# Patient Record
Sex: MALE | Race: WHITE | Employment: FULL TIME | URBAN - METROPOLITAN AREA
[De-identification: names, ages, dates, MRNs, and addresses within clinical notes are randomized per-mention and may not be internally consistent; named-entity substitution may affect disease eponyms.]

---

## 2019-01-13 ENCOUNTER — APPOINTMENT (OUTPATIENT)
Dept: GENERAL RADIOLOGY | Age: 54
End: 2019-01-13
Payer: COMMERCIAL

## 2019-01-13 ENCOUNTER — HOSPITAL ENCOUNTER (EMERGENCY)
Age: 54
Discharge: HOME | End: 2019-01-13
Payer: COMMERCIAL

## 2019-01-13 VITALS
RESPIRATION RATE: 16 BRPM | BODY MASS INDEX: 29.18 KG/M2 | SYSTOLIC BLOOD PRESSURE: 148 MMHG | HEIGHT: 69 IN | WEIGHT: 197 LBS | HEART RATE: 75 BPM | OXYGEN SATURATION: 96 % | TEMPERATURE: 98.2 F | DIASTOLIC BLOOD PRESSURE: 92 MMHG

## 2019-01-13 DIAGNOSIS — S61.451A DOG BITE OF RIGHT HAND, INITIAL ENCOUNTER: Primary | ICD-10-CM
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
## 2019-01-13 PROCEDURE — 73130 X-RAY EXAM OF HAND: CPT

## 2019-01-13 PROCEDURE — 90471 IMMUNIZATION ADMIN: CPT | Performed by: PHYSICIAN ASSISTANT

## 2019-01-13 PROCEDURE — 90715 TDAP VACCINE 7 YRS/> IM: CPT | Performed by: PHYSICIAN ASSISTANT

## 2019-01-13 PROCEDURE — 96365 THER/PROPH/DIAG IV INF INIT: CPT

## 2019-01-13 PROCEDURE — 96368 THER/DIAG CONCURRENT INF: CPT

## 2019-01-13 PROCEDURE — 6360000002 HC RX W HCPCS: Performed by: PHYSICIAN ASSISTANT

## 2019-01-13 PROCEDURE — 2500000003 HC RX 250 WO HCPCS: Performed by: PHYSICIAN ASSISTANT

## 2019-01-13 PROCEDURE — 96366 THER/PROPH/DIAG IV INF ADDON: CPT

## 2019-01-13 PROCEDURE — 99283 EMERGENCY DEPT VISIT LOW MDM: CPT

## 2019-01-13 RX ORDER — LEVOFLOXACIN 5 MG/ML
500 INJECTION, SOLUTION INTRAVENOUS EVERY 24 HOURS
Status: DISCONTINUED | OUTPATIENT
Start: 2019-01-13 | End: 2019-01-13 | Stop reason: HOSPADM

## 2019-01-13 RX ORDER — CLINDAMYCIN PHOSPHATE 600 MG/50ML
600 INJECTION INTRAVENOUS ONCE
Status: COMPLETED | OUTPATIENT
Start: 2019-01-13 | End: 2019-01-13

## 2019-01-13 RX ORDER — DIAPER,BRIEF,INFANT-TODD,DISP
EACH MISCELLANEOUS ONCE
Status: DISCONTINUED | OUTPATIENT
Start: 2019-01-13 | End: 2019-01-13 | Stop reason: HOSPADM

## 2019-01-13 RX ORDER — IBUPROFEN 800 MG/1
800 TABLET ORAL EVERY 6 HOURS PRN
Qty: 20 TABLET | Refills: 0 | Status: SHIPPED | OUTPATIENT
Start: 2019-01-13 | End: 2022-08-22

## 2019-01-13 RX ORDER — DIAPER,BRIEF,INFANT-TODD,DISP
EACH MISCELLANEOUS
Status: DISCONTINUED
Start: 2019-01-13 | End: 2019-01-13 | Stop reason: HOSPADM

## 2019-01-13 RX ORDER — AMOXICILLIN AND CLAVULANATE POTASSIUM 875; 125 MG/1; MG/1
1 TABLET, FILM COATED ORAL 2 TIMES DAILY
Qty: 14 TABLET | Refills: 0 | Status: SHIPPED | OUTPATIENT
Start: 2019-01-13 | End: 2019-01-20

## 2019-01-13 RX ADMIN — CLINDAMYCIN PHOSPHATE 600 MG: 600 INJECTION, SOLUTION INTRAVENOUS at 15:33

## 2019-01-13 RX ADMIN — LEVOFLOXACIN 500 MG: 5 INJECTION, SOLUTION INTRAVENOUS at 15:33

## 2019-01-13 RX ADMIN — TETANUS TOXOID, REDUCED DIPHTHERIA TOXOID AND ACELLULAR PERTUSSIS VACCINE, ADSORBED 0.5 ML: 5; 2.5; 8; 8; 2.5 SUSPENSION INTRAMUSCULAR at 15:33

## 2019-01-13 ASSESSMENT — PAIN DESCRIPTION - LOCATION: LOCATION: HAND

## 2019-01-13 ASSESSMENT — PAIN DESCRIPTION - ORIENTATION: ORIENTATION: RIGHT

## 2019-01-13 ASSESSMENT — PAIN DESCRIPTION - PAIN TYPE: TYPE: ACUTE PAIN

## 2019-01-13 ASSESSMENT — PAIN SCALES - GENERAL: PAINLEVEL_OUTOF10: 4

## 2019-01-13 ASSESSMENT — PAIN DESCRIPTION - DESCRIPTORS: DESCRIPTORS: ACHING

## 2022-08-22 ENCOUNTER — APPOINTMENT (OUTPATIENT)
Dept: GENERAL RADIOLOGY | Age: 57
End: 2022-08-22
Payer: COMMERCIAL

## 2022-08-22 ENCOUNTER — HOSPITAL ENCOUNTER (EMERGENCY)
Age: 57
Discharge: HOME | End: 2022-08-22
Attending: EMERGENCY MEDICINE
Payer: COMMERCIAL

## 2022-08-22 VITALS
WEIGHT: 209 LBS | OXYGEN SATURATION: 98 % | HEART RATE: 67 BPM | RESPIRATION RATE: 17 BRPM | SYSTOLIC BLOOD PRESSURE: 173 MMHG | DIASTOLIC BLOOD PRESSURE: 119 MMHG | TEMPERATURE: 97.4 F | BODY MASS INDEX: 30.96 KG/M2 | HEIGHT: 69 IN

## 2022-08-22 DIAGNOSIS — S46.902A OPEN WOUND OF LEFT UPPER EXTREMITY WITH TENDON INJURY, INITIAL ENCOUNTER: Primary | ICD-10-CM

## 2022-08-22 DIAGNOSIS — S61.512A LACERATION OF LEFT WRIST WITH TENDON INVOLVEMENT, INITIAL ENCOUNTER: ICD-10-CM

## 2022-08-22 DIAGNOSIS — S41.102A OPEN WOUND OF LEFT UPPER EXTREMITY WITH TENDON INJURY, INITIAL ENCOUNTER: Primary | ICD-10-CM

## 2022-08-22 DIAGNOSIS — S66.922A LACERATION OF LEFT WRIST WITH TENDON INVOLVEMENT, INITIAL ENCOUNTER: ICD-10-CM

## 2022-08-22 PROCEDURE — 2500000003 HC RX 250 WO HCPCS: Performed by: NURSE PRACTITIONER

## 2022-08-22 PROCEDURE — 73110 X-RAY EXAM OF WRIST: CPT

## 2022-08-22 PROCEDURE — 12004 RPR S/N/AX/GEN/TRK7.6-12.5CM: CPT

## 2022-08-22 PROCEDURE — 99283 EMERGENCY DEPT VISIT LOW MDM: CPT

## 2022-08-22 PROCEDURE — 6370000000 HC RX 637 (ALT 250 FOR IP): Performed by: NURSE PRACTITIONER

## 2022-08-22 RX ORDER — CEPHALEXIN 500 MG/1
500 CAPSULE ORAL 4 TIMES DAILY
Qty: 20 CAPSULE | Refills: 0 | Status: SHIPPED | OUTPATIENT
Start: 2022-08-22 | End: 2022-08-27

## 2022-08-22 RX ORDER — IBUPROFEN 600 MG/1
600 TABLET ORAL EVERY 8 HOURS PRN
Qty: 30 TABLET | Refills: 0 | Status: SHIPPED | OUTPATIENT
Start: 2022-08-22 | End: 2022-09-02

## 2022-08-22 RX ORDER — IBUPROFEN 800 MG/1
800 TABLET ORAL ONCE
Status: COMPLETED | OUTPATIENT
Start: 2022-08-22 | End: 2022-08-22

## 2022-08-22 RX ORDER — LIDOCAINE HYDROCHLORIDE AND EPINEPHRINE 10; 10 MG/ML; UG/ML
20 INJECTION, SOLUTION INFILTRATION; PERINEURAL ONCE
Status: COMPLETED | OUTPATIENT
Start: 2022-08-22 | End: 2022-08-22

## 2022-08-22 RX ADMIN — IBUPROFEN 800 MG: 800 TABLET, FILM COATED ORAL at 03:57

## 2022-08-22 RX ADMIN — LIDOCAINE HYDROCHLORIDE AND EPINEPHRINE 20 ML: 10; 10 INJECTION, SOLUTION INFILTRATION; PERINEURAL at 03:29

## 2022-08-22 NOTE — ED PROVIDER NOTES
ATTENDING PROVIDER ATTESTATION:     Park Oliveira presented to the emergency department for evaluation of Laceration (Pt to ED stating a piece of sheet metal sliced his L-wrist around midnight while at work. Pt states he is up to date w tetanus shot, bleeding controlled. Pt able to move all fingers except thumb. Denies numbness or tingling.)    I have reviewed and discussed the case, including pertinent history (medical, surgical, family and social) and exam findings with the Midlevel and the Nurse assigned to Park Oliveira. I have personally performed and/or participated in the history, exam, medical decision making, and procedures and agree with all pertinent clinical information. I have personally performed a substantive portion of the encounter      I have reviewed my findings and recommendations with Park Oliveira and members of family present at the time of disposition. My findings/plan: The primary encounter diagnosis was Open wound of left upper extremity with tendon injury, initial encounter. A diagnosis of Laceration of left wrist with tendon involvement, initial encounter was also pertinent to this visit. I, Dr. Uvaldo Vincent, am the primary provider of this record. New Prescriptions    CEPHALEXIN (KEFLEX) 500 MG CAPSULE    Take 1 capsule by mouth 4 times daily for 5 days    IBUPROFEN (IBU) 600 MG TABLET    Take 1 tablet by mouth every 8 hours as needed for Pain Take with food. Rosina Oliver DO      ED Attending shared visit  CC: No       [address] [address] [facility]  Department of Emergency Medicine   ED  Encounter Note  Admit Date/RoomTime: 2022  3:13 AM  ED Room:     NAME: Park Oliveira  : 1965  MRN: [id number]     Chief Complaint:  Laceration (Pt to ED stating a piece of sheet metal sliced his L-wrist around midnight while at work. Pt states he is up to date w tetanus shot, bleeding controlled. Pt able to move all fingers except thumb.  Denies numbness or tingling.)    History of Present Illness       Nely Patton is a 55 y.o. old male presenting to the emergency department by private vehicle, for a laceration to the left wrist, caused by sheet metal, which occurred at work approximately several hour(s) prior to arrival.  There is not a possibility of retained foreign body in the affected area. Bleeding is  controlled. He takes no blood thinning agents. There is pain at injury site. Tetanus Status:  up to date. ROS   Pertinent positives and negatives are stated within HPI, all other systems reviewed and are negative. Past Medical History:  has a past medical history of Hypertension. Surgical History:  has no past surgical history on file. Social History:  reports that he has never smoked. His smokeless tobacco use includes chew. He reports that he does not drink alcohol and does not use drugs. Family History: family history is not on file. Allergies: Patient has no known allergies. Physical Exam  Physical Exam   Oxygen Saturation Interpretation: Normal.        ED Triage Vitals   BP Temp Temp src Heart Rate Resp SpO2 Height Weight   08/22/22 0311 08/22/22 0307 -- 08/22/22 0307 08/22/22 0311 08/22/22 0307 08/22/22 0311 08/22/22 0311   (!) 173/119 97.4 °F (36.3 °C)  67 17 98 % 5' 9\" (1.753 m) 209 lb (94.8 kg)         Constitutional:  Alert, development consistent with age. Neck:  Normal ROM. Supple. Non-tender. Hand: Left             Tenderness: none. Swelling: none. Deformity: No.             Skin: no wounds, erythema, or swelling. Neurovascular: Motor deficit: none. Sensory deficit: none. Pulse deficit: none. Capillary refill: normal.  Fingers:  Left Thumb             Tenderness:  mild. Swelling: none. Deformity: No.              ROM: diminished range with pain.             Skin:  no wounds, erythema, or swelling. Wrist:  Left distal radius             Tenderness:  mild. Swelling: None. Deformity: No.             ROM: full range with pain, Full range of motion. Skin:  8cm linear laceration. Lymphatics: No lymphangitis or adenopathy noted. Neurological:  Oriented. Motor functions intact. Lab / Imaging Results   (All laboratory and radiology results have been personally reviewed by myself)  Labs:  No results found for this visit on 08/22/22. Imaging: All Radiology results interpreted by Radiologist unless otherwise noted. XR WRIST LEFT (MIN 3 VIEWS)   Final Result   No acute osseous abnormality. No radiopaque foreign body. ED Course / Medical Decision Making     Medications   lidocaine-EPINEPHrine 1 %-1:[id number] injection 20 mL (20 mLs IntraDERmal Given by Other 8/22/22 7343)   ibuprofen (ADVIL;MOTRIN) tablet 800 mg (800 mg Oral Given 8/22/22 3266)       Consult(s):   IP CONSULT TO ORTHOPEDIC SURGERY        MDM: Patient is well-appearing, GCS of 15. Presents with laceration to left radial wrist after being struck with sheet metal while at work. On exam patient has limited range of motion of the left thumb concerns for tendon injury. X-ray obtained negative for any acute findings. Orthopedics was consulted see separate note, disposition per orthopedics. Orthopedic surgery close the patient's wound and will follow up with the patient and 10 to 14 days outpatient. Patient will be started on Keflex for infection prophylaxis ibuprofen as needed for pain and patient advised to follow-up outpatient with hand surgery instructions given as well as return precautions. Plan of Care/Counseling:  DARLYN Perscott CNP reviewed today's visit with the patient in addition to providing specific details for the plan of care and counseling regarding the diagnosis and prognosis. Questions are answered at this time and are agreeable with the plan. Assessment      1. Open wound of left upper extremity with tendon injury, initial encounter    2. Laceration of left wrist with tendon involvement, initial encounter      Plan   Discharged home. Patient condition is good    New Medications     New Prescriptions    CEPHALEXIN (KEFLEX) 500 MG CAPSULE    Take 1 capsule by mouth 4 times daily for 5 days    IBUPROFEN (IBU) 600 MG TABLET    Take 1 tablet by mouth every 8 hours as needed for Pain Take with food. Electronically signed by DARLYN Tam CNP   DD: 8/22/22  **This report was transcribed using voice recognition software. Every effort was made to ensure accuracy; however, inadvertent computerized transcription errors may be present.   END OF ED PROVIDER NOTE       Stella Naqvi DO  08/22/22 2979

## 2022-08-22 NOTE — CONSULTS
Department of Orthopedic Surgery  Resident Consult Note    Reason for Consult:  laceration    HISTORY OF PRESENT ILLNESS:       Patient is a 55 y.o. male with laceration to the left radial aspect of the wrist. Reports that a piece of metal fell onto his wrist while he was at work. He initially presented to an ED in [facility], but there was a 4 hour wait so he presented to this facility. Reports that his L thumb ROM has improved over the past hour or so. Denies numbness/tingling/paresthesias. Denies any other orthopedic complaints at this time. Past Medical History:        Diagnosis Date    Hypertension      Past Surgical History:    History reviewed. No pertinent surgical history. Current Medications:   No current facility-administered medications for this encounter. Allergies:  Patient has no known allergies. Social History:   TOBACCO:   reports that he has never smoked. His smokeless tobacco use includes chew. ETOH:   reports no history of alcohol use. DRUGS:   reports no history of drug use. ACTIVITIES OF DAILY LIVING:    OCCUPATION:    Family History:   History reviewed. No pertinent family history.     REVIEW OF SYSTEMS:  CONSTITUTIONAL:  negative for fevers, chills  EYES:  negative for acute changes  HEENT:  negative for acute changes  RESPIRATORY:  negative for dyspnea  CARDIOVASCULAR:  negative for chest pain, palpitations  GASTROINTESTINAL:  negative for nausea, vomiting  GENITOURINARY:  negative for frequency  HEMATOLOGIC/LYMPHATIC:  negative for bleeding and petechiae  MUSCULOSKELETAL:  positive for left forearm laceration  NEUROLOGICAL:  negative for head trauma or LOC  BEHAVIOR/PSYCH:  negative for increased agitation and anxiety    PHYSICAL EXAM:    VITALS:  BP (!) 173/119   Pulse 67   Temp 97.4 °F (36.3 °C)   Resp 17   Ht 5' 9\" (1.753 m)   Wt 209 lb (94.8 kg)   SpO2 98%   BMI 30.86 kg/m²   CONSTITUTIONAL:  awake, alert, cooperative, no apparent distress, and appears stated age  EYES: Lids and lashes normal, extra ocular muscles intact, sclera clear, conjunctiva normal  ENT: Normocephalic, without obvious abnormality, atraumatic, external ears without lesions, oral pharynx with moist mucus membranes  NECK: Trachea midline, skin normal  LUNGS: No increased work of breathing, good air exchange  CARDIOVASCULAR: 2+ pulses throughout and capillary Refill less than 2 seconds  ABDOMEN: soft, non-distended, non-tender and no rebound tenderness or guarding  NEUROLOGIC: Awake, alert, oriented to name, place and time. Motor and sensory abnormalities noted below, otherwise motor is 5 out of 5 bilaterally and sensory is intact. MUSCULOSKELETAL:  Left upper Extremity:  8 cm laceration over the radial aspect of the wrist. No active bleeding or venous ooze appreciated. Deep fascia is not violated. Otherwise, skin intact circumferentially   Compartments soft and compressible  +AIN/PIN/Ulnar nerve function intact grossly  +Radial pulse, Brisk Cap refill, hand warm and perfused  Sensation intact to touch in radial/ulnar/median nerve distributions to hand  Patient able to flex and extend all digits, most notably he is able to extend his first IP and MCP joint    Secondary Exam:   Right UE: No obvious signs of trauma. -TTP to fingers, hand, wrist, forearm, elbow, humerus, shoulder or clavicle. -- Patient able to flex/extend fingers, wrist, elbow and shoulder with active and passive ROM without pain, compartments soft and compressible. DATA:    CBC: No results found for: WBC, RBC, HGB, HCT, MCV, MCH, MCHC, RDW, PLT, MPV  PT/INR:  No results found for: PROTIME, INR    Radiology Review:  XR left wrist: no acute osseous abnormalities appreciated.      IMPRESSION:  Superficial laceration to the radial wrist    PLAN:  WBAT L UE  Laceration Repair Procedure Note: The patient was placed in the appropriate position and anesthesia around the laceration was obtained by infiltration using 1% Lidocaine without epinephrine. The area was then cleansed with betadine and draped in a sterile fashion. The laceration was closed with 3-0 Ethilon using interrupted sutures. There were no additional lacerations requiring repair. The wound area was then dressed with a sterile dressing. Total wound length 8 cm.    Multimodal pain control  Keep dressing clean and dry  Rest, ice, elevate L UE  Follow up outpatient with Dr. Alesha Barillas in 10-14 days  Plan was discussed with attending    All questions were sought and answered during encounter    Electronically signed by Christiano Matias DO on 8/22/2022 at 5:10 AM

## 2022-08-23 ENCOUNTER — TELEPHONE (OUTPATIENT)
Dept: ORTHOPEDIC SURGERY | Age: 57
End: 2022-08-23

## 2022-08-23 NOTE — TELEPHONE ENCOUNTER
Patient was seen at [facility] for Open wound of left upper extremity with tendon injury, initial encounter; Laceration of left wrist with tendon involvement, initial encounter. Was advised to follow up with Dr Camden Sandoval. Please contact the patient to schedule.

## 2022-08-23 NOTE — TELEPHONE ENCOUNTER
Call placed to patient's spouse, appointment made at this time. Future Appointments   Date Time Provider Suzette Eduardo   9/2/2022  9:00 AM SCHEDULE, SE ORTHO [facility]     Directions to office provided and patient verbalized understanding and is agreeable with plan.

## 2022-08-26 ENCOUNTER — TELEPHONE (OUTPATIENT)
Dept: ORTHOPEDIC SURGERY | Age: 57
End: 2022-08-26

## 2022-08-26 NOTE — TELEPHONE ENCOUNTER
Pt has a scheduled appt   Future Appointments   Date Time Provider Suzette Alesha   9/2/2022  9:00 AM SCHEDULE, SE ORTHO RES SE [facility]     He needs a RTW letter stating that he can work light duty. 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 He has already been working but he employer is requesting a letter. He will call back w/ a fax#. Routing to provider for review & guidance.

## 2022-08-26 NOTE — TELEPHONE ENCOUNTER
Letter for RTW printed. According to ortho consult, no tendon involvement, just superficial laceration and can weight bear through the affected extremity.

## 2022-08-26 NOTE — LETTER
[address]  [address]  [address] [facility] [address]  Phone: [phone]  Fax: [phone]    Candace Fontenot      August 26, 2022     Patient: Nurys Monge   YOB: 1965   Date of Visit: 8/26/2022       To Whom It May Concern: It is my medical opinion that Matt Garcia be released to work light duty. He must keep any healing wounds covered at all times. He can weight bear up to 25 lbs through the affected extremity. If you have any questions or concerns, please don't hesitate to call.     Sincerely,        Chris Sheriff PA-C

## 2022-09-02 ENCOUNTER — OFFICE VISIT (OUTPATIENT)
Dept: ORTHOPEDIC SURGERY | Age: 57
End: 2022-09-02
Payer: COMMERCIAL

## 2022-09-02 VITALS — HEIGHT: 69 IN | BODY MASS INDEX: 30.36 KG/M2 | WEIGHT: 205 LBS

## 2022-09-02 DIAGNOSIS — S61.512A LACERATION OF SKIN OF LEFT WRIST, INITIAL ENCOUNTER: Primary | ICD-10-CM

## 2022-09-02 PROCEDURE — 99213 OFFICE O/P EST LOW 20 MIN: CPT | Performed by: STUDENT IN AN ORGANIZED HEALTH CARE EDUCATION/TRAINING PROGRAM

## 2022-09-02 PROCEDURE — 99212 OFFICE O/P EST SF 10 MIN: CPT | Performed by: ORTHOPAEDIC SURGERY

## 2022-09-02 NOTE — PROGRESS NOTES
Chief Complaint   Patient presents with    Follow-up     Follolw up L wrist lac. Patient has kept the area clean and dry since injury. Patient reports one of the sutures came out. Denies pain at rest, reports numbness and tingling with supination. SUBJECTIVE: Patient is a 61-year-old male presenting for follow-up regarding a laceration injury to the left wrist he sustained at work on 8/22/2022. He was evaluated by an orthopedic resident in the emergency department and his wound was irrigated, debrided and closed with nylon suture. He has continued to work with light duty restrictions since that time. He has been keeping the wound covered when working. He does have pain with supination activities primarily. He notes resolving feelings of numbness and tingling in the hand since the injury. Pain is well controlled at this point. No additional injuries or complaints. Review of Systems:  General ROS: negative for - chills, fatigue, fever or night sweats  Respiratory ROS: no cough, shortness of breath, or wheezing  Cardiovascular ROS: no chest pain or dyspnea on exertion  Gastrointestinal ROS: no abdominal pain, change in bowel habits, or black or bloody stools  Genitourinary: no hematuria, dysuria, or incontinence   Musculoskeletal ROS:see above  Neurological ROS: no TIA or stroke symptoms       OBJECTIVE:   Alert and oriented X 3, no acute distress, respirations easy and unlabored with no audible wheezes, skin warm and dry, speech and dress appropriate for noted age, affect euthymic. Extremity:  Laceration over the dorsal and radial aspect of the wrist is well approximated with nylon sutures. No drainage present today. There is localized erythema about the sutures.   Sensation present and normal per patient in the radial, ulnar, and median nerve distributions  Motor function intact to AIN, PIN, median, radial, and ulnar nerve distributions  Radial pulse +2/4, capillary refill intact all digits  Compartments soft and compressible      Ht 5' 9\" (1.753 m)   Wt 205 lb (93 kg)   BMI 30.27 kg/m²     ASSESSMENT:     Diagnosis Orders   1. Laceration of skin of left wrist, initial encounter            PLAN:    Laceration site over the dorsal radial aspect of the left wrist is healing. We discussed with patient the importance of keeping a close eye in the wound for any signs of developing infection which would include increased swelling, erythema, drainage, and/or pain. He will seek urgent medical evaluation and treatment should these present. We will otherwise plan on seeing him back in clinic in 2 weeks for repeat examination. Provided his laceration site continues to heal uneventfully, we anticipate transition back to full duty at that time. [facility] Patient Plan Of Care  New Orders Needing C-9 Authorization: None at this appointment  [employer]/Work Status: Released to light duty if available with restrictions of no lifting heavier than 25 pounds with the left upper extremity  Patient Progressing: Progressing as expected  Patient candidate for Vocational Rehab at this time: No  Patient determined to be [address] [place] [address] at this visit: No        Electronically signed by Arnoldo Gaffney DO on 9/2/2022 at 9:21 AM  Note: This report was completed using computerVitaldent voiced recognition software. Every effort has been made to ensure accuracy; however, inadvertent computerized transcription errors may be present. I performed a history and physical exam of the patient, reviewed pertinent imaging, and discussed the patient's management with the resident. I reviewed the resident's note and agree with the documented findings and plan of care.

## 2022-09-02 NOTE — LETTER
[address]  [address]  [address] [facility] [address]  Phone: [phone]  Fax: [phone]        September 2, 2022     Patient: Maco Goyal   YOB: 1965   Date of Visit: 9/2/2022       To Whom It May Concern: It is my medical opinion that Kvng Kam be released to work light duty. He must keep any healing wounds covered at all times. He can weight bear up to 25 lbs through the affected extremity. These restrictions must remain in place until re-evaluated in office on 9/15/2022. Future Appointments   Date Time Provider Suzette Eduardo   9/15/2022  7:45 AM Remi Harvey DO [facility]          If you have any questions or concerns, please don't hesitate to call.     Sincerely,          Abigail Mendoza, DO

## 2022-09-02 NOTE — PATIENT INSTRUCTIONS
Department of Orthopaedic Trauma  [facility]    Dr. Viki Hocking, DO Dr. Genaro Solid, MD Dr. Twanda Piccolo, MD Eddie Hones, PA-C Samella Fluke PA-C Martyn Snellen PA-C    Orthopaedics Discharge Instructions   Weight bearing Status - Weight bearing as tolerated - on left upper Extremity. Continue light duty restrictions, no lifting heavier than 25 pounds with the left upper extremity. Ice to operative/injured site for 15-30 minutes of each hour for next 5 days    Recommend that you continue to ice the area 2-3 times per day after this   Elevate operative/injured limb on 2 pillows at home  Goal is to have limb above the heart if able    Wound care -keep incision covered when working. Take care to keep wound clean and dry. Incision should not be covered at Rest.    Follow Up in Office in 2 weeks for repeat examination and evaluation of wound. Call the office at [phone] for directions or with any questions. Watch for these significant complications. Call physician if they or any other problems occur:  Fever over 101°, redness, swelling or warmth at the operative site  Unrelieved nausea    Foul smelling or cloudy drainage at the operative site   Unrelieved pain    Blood soaked dressing.  (Some oozing may be normal)     Numb, pale, blue, cold or tingling extremity    Future Appointments   Date Time Provider Suzette Eduardo   9/15/2022  7:45 AM Caroline Staley DO [facility]         Directions to Orthopaedic Trauma Office at [facility]:   [address] [place] [address], [facility] [address]  through the ED parking lot [address]  At far side of the parking lot is [address] (large blue awning)-- Enter here  Our office is straight ahead through this entrance and check in will be on your left        It is the Department of Orthopaedic Trauma's standard of practice that providers will de-escalate(wean) all patients from narcotic(opioid) medications during the post-operative period. We provide multimodal pain control but opioid medications are tapered in all of our patients. If patient requires referral to pain management for prolonged taper off of opioid pain medication we will facilitate this process.

## 2022-09-15 ENCOUNTER — OFFICE VISIT (OUTPATIENT)
Dept: ORTHOPEDIC SURGERY | Age: 57
End: 2022-09-15
Payer: COMMERCIAL

## 2022-09-15 DIAGNOSIS — S61.512A LACERATION OF SKIN OF LEFT WRIST, INITIAL ENCOUNTER: Primary | ICD-10-CM

## 2022-09-15 PROCEDURE — 99212 OFFICE O/P EST SF 10 MIN: CPT

## 2022-09-15 PROCEDURE — 99213 OFFICE O/P EST LOW 20 MIN: CPT | Performed by: PHYSICIAN ASSISTANT

## 2022-09-15 NOTE — LETTER
[address]  [place] [address]  Phone: [phone]  Fax: [phone]        September 15, 2022     Patient: Tamika Carrero   YOB: 1965   Date of Visit: 9/15/2022       To Whom It May Concern: It is my medical opinion that Pamela Burgos may return to work on 9-15-22 full duty with no restrictions. If you have any questions or concerns, please don't hesitate to call.     Sincerely,        ROLY Boothe

## 2022-09-15 NOTE — PROGRESS NOTES
Chief Complaint   Patient presents with    Follow-up     Workers comp f/u left wrist laceration. No new complaints. SUBJECTIVE: Ezequiel Kam presents today for follow-up for a left wrist laceration. He was injured at work on 8-. 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 States that he is doing well and having no problems with the left hand. He denies numbness, tingling or paresthesias. No other orthopedic complaints at this time. Review of Systems -   General ROS: negative for - chills, fatigue, fever or night sweats  Respiratory ROS: no cough, shortness of breath, or wheezing  Cardiovascular ROS: no chest pain or dyspnea on exertion  Gastrointestinal ROS: no abdominal pain, change in bowel habits, or black or bloody stools  Genitourinary: no hematuria, dysuria, or incontinence   Musculoskeletal ROS:see above  Neurological ROS: no TIA or stroke symptoms     OBJECTIVE:   Alert and oriented X 3, no acute distress, respirations easy and unlabored with no audible wheezes, skin warm and dry, speech and dress appropriate for noted age, affect euthymic. Extremity:  Left Upper Extremity  Skin is clean dry and intact  Left wrist laceration is well-healed, no erythema, swelling or drainage  Radial pulse palpable, fingers warm with BCR  Flex/extension intact to wrist, thumb and fingers  Finger opposition intact  Finger adduction/abduction intact  Finger crossover intact  Subjectively states sensation intact to radial/medial/ulnar distribution    XR: 9/15/22   Not taken today. There were no vitals taken for this visit. ASSESSMENT:   Diagnosis Orders   1. Laceration of skin of left wrist, subsequent encounter          PLAN:  Activities as tolerated with the left upper extremity. Patient was given a form to return to work full duty. Follow-up as needed. Call if any questions or concerns.     [facility] Patient Plan Of Care  New Orders Needing C-9 Authorization: None at this appointment  [employer]/Work Status: Released to work full duty no restrictions 9-15-22  Patient Progressing: Progressing as expected  Patient candidate for Vocational Rehab at this time: No  Patient determined to be [facility] [address] [place] [address] at this visit: Yes     Electronically signed by ROLY Leo on 9/15/2022 at 8:06 AM  Note: This report was completed using i-drive voiced recognition software. Every effort has been made to ensure accuracy; however, inadvertent computerized transcription errors may be present.

## 2023-01-06 ENCOUNTER — OFFICE VISIT (OUTPATIENT)
Dept: ORTHOPEDIC SURGERY | Age: 58
End: 2023-01-06
Payer: COMMERCIAL

## 2023-01-06 DIAGNOSIS — S61.512A LACERATION OF SKIN OF LEFT WRIST, INITIAL ENCOUNTER: Primary | ICD-10-CM

## 2023-01-06 PROCEDURE — 99024 POSTOP FOLLOW-UP VISIT: CPT | Performed by: ORTHOPAEDIC SURGERY

## 2023-01-06 PROCEDURE — 99212 OFFICE O/P EST SF 10 MIN: CPT | Performed by: ORTHOPAEDIC SURGERY

## 2023-01-06 RX ORDER — ROSUVASTATIN CALCIUM 10 MG/1
TABLET, COATED ORAL
COMMUNITY
Start: 2022-12-15

## 2023-01-06 RX ORDER — ERGOCALCIFEROL 1.25 MG/1
CAPSULE ORAL
COMMUNITY
Start: 2022-12-13

## 2023-01-06 NOTE — PROGRESS NOTES
Chief Complaint   Patient presents with    Follow-up     Follow up left wrist. Patient still having some pain and inability to do certain activities. SUBJECTIVE: 11year-old male presents today for follow-up visit for a left wrist laceration radial aspect dorsal with some volar extension. This happened on 8- when metal fell on his left wrist at work. He has been seen and followed several times in clinic. Patient has no known tendon or nerve deficits. Patient states that he is doing great no pain at rest however he has sent noticed some pain on the ulnar aspect of his wrist as he is started back up golfing and bowling. He denies any numbness or tingling      Review of Systems -   General ROS: negative for - chills, fatigue, fever or night sweats  Respiratory ROS: no cough, shortness of breath, or wheezing  Cardiovascular ROS: no chest pain or dyspnea on exertion  Gastrointestinal ROS: no abdominal pain, change in bowel habits, or black or bloody stools  Genitourinary: no hematuria, dysuria, or incontinence   Musculoskeletal ROS:see above  Neurological ROS: no TIA or stroke symptoms       OBJECTIVE:   Alert and oriented X 3, no acute distress, respirations easy and unlabored with no audible wheezes, skin warm and dry, speech and dress appropriate for noted age, affect euthymic.     Extremity:  Left upper extremity  Healed laceration over the radial aspect of the left wrist starts dorsally and extends volarly   Patient has no tenderness to palpation, negative Tinel's test ulnar tunnel   compartments soft and compressible  +AIN/PIN/Ulnar nerve function intact grossly  +Radial pulse, Brisk Cap refill, hand warm and perfused  Sensation intact to touch in radial/ulnar/median nerve distributions to hand   Patient has intact flexor and extensor tendons first through fifth digits  Thumb opposition abduction abduction intact  No atrophy noted about the left hand    XR: 1/6/23   No x-rays    Impression: Status post left wrist laceration    There were no vitals taken for this visit. ASSESSMENT:     Diagnosis Orders   1. Laceration of skin of left wrist, subsequent encounter            PLAN:  Full weightbearing left upper extremity  Patient was instructed that his left ulnar-sided wrist pain is likely due to muscle imbalance seen secondary to immobilization of the left wrist for some time due to healing of left wrist laceration, I instructed patient he can try taking ibuprofen before he anticipates performing an event. Patient was encouraged to continue his home physical therapy sheet that he was provided for left wrist strengthening and hand  strengthening, I also enforced the use of forearm strengthening. Patient will follow-up as needed   All questions were sought and answered today's visit    Electronically signed by Clara Hayden DO on 1/6/2023 at 9:27 AM  Note: This report was completed using computerize voiced recognition software. Every effort has been made to ensure accuracy; however, inadvertent computerized transcription errors may be present.

## 2023-02-21 ENCOUNTER — TELEPHONE (OUTPATIENT)
Dept: PHYSICAL MEDICINE AND REHAB | Age: 58
End: 2023-02-21

## 2023-02-21 NOTE — TELEPHONE ENCOUNTER
Spoke with patient. Needs to call office back to figure out a good time to schedule workers comp EMG on LUE.

## 2023-03-15 DIAGNOSIS — M25.532 LEFT WRIST PAIN: Primary | ICD-10-CM

## 2023-03-16 ENCOUNTER — PROCEDURE VISIT (OUTPATIENT)
Dept: PHYSICAL MEDICINE AND REHAB | Age: 58
End: 2023-03-16

## 2023-03-16 VITALS — BODY MASS INDEX: 30.36 KG/M2 | HEIGHT: 69 IN | WEIGHT: 205 LBS

## 2023-03-16 DIAGNOSIS — M79.632 LEFT FOREARM PAIN: Primary | ICD-10-CM

## 2023-03-16 DIAGNOSIS — M25.532 LEFT WRIST PAIN: ICD-10-CM

## 2023-03-16 DIAGNOSIS — S61.512S: ICD-10-CM

## 2023-03-16 NOTE — PATIENT INSTRUCTIONS
Electrodiagnotic Laboratory  Accredited by the [facility] with Exemplary status  BERRY Dalal D.O. [facility]  [address]  Phone: [phone]  Fax: [phone]        Today you had an electrodiagnostic exam which included nerve conduction studies (NCS) and electromyography (EMG). This test evaluated the electrical activity of your nerves and muscles to help determine if you have a nerve or muscle disease. This test can help determine the location and type of a nerve or muscle problem. This will help your referring doctor diagnose your condition and determine the appropriate next step in your treatment plan. After your test:    1. There are no long lasting side effects of the test.     2. You may resume your normal activities without restrictions. 3.  Resume any medications that were stopped for the test.     4  If you have sore areas or bruising in your muscles where the needle was placed, apply a cold pack to the sore area for 15-20 minutes three to four times a day as needed for pain. The soreness should go away in about 1-2 days. 5. Your results were provided  Briefly at the end of your test and the final detailed report will be provided to your referring physician, and/or primary care physician and any other parties you requested within 1-2 days of the examination. You may wish to contact your referring provider after a few days to determine what they would like you to do next. 6.  Please call [phone] with any questions or concerns and if you develop increased body temperature/fever, swelling, tenderness, increased pain and/or drainage from the sites where the needle was placed. Thank you for choosing us for your health care needs.

## 2023-03-16 NOTE — PROGRESS NOTES
[address] [facility]  Electrodiagnostic Laboratory  *Accredited by the [address] [place] [address] with exemplary status  [address]  Phone: [phone]  Fax: [phone]    Referring Provider: No ref. provider found  Primary Care Physician: Rita Camp MD  Patient Name: Dominique Thorne  Patient YOB: 1965  Gender: male  BMI: Body mass index is 30.27 kg/m². Height 5' 9\" (1.753 m), weight 205 lb (93 kg). 3/16/2023    Reason for Referral: Wrist pain    Description of clinical problem:   Chief Complaint   Patient presents with    Extremity Pain     Pain along the ulnar nerve with rotation and weight. Since August after a cut at work. Numbness     Numbness along the cut. Extremity Weakness     Decrease strength in hand. Brief physical exam:   Sensory deficit No; Weakness No; Atrophy  No    Sensory NCS      Nerve / Sites Rec. Site Peak Lat PP Amp Segments Distance Velocity Temp. ms µV  cm m/s °C   L Median - Digit II (Antidromic)      Palm Dig II 1.88 34.8 Palm - Dig II 7 52 32.2      Wrist Dig II 4.17 27.2 Wrist - Dig II 14 43 32.2   L Ulnar - Digit V (Antidromic)      Wrist Dig V 3.44 24.7 Wrist - Dig V 14 52 32.2   L Radial - Anatomical snuff box (Forearm)      Forearm Wrist 2.40 13.7 Forearm - Wrist 10 60 32.3       Motor NCS      Nerve / Sites Muscle Onset Amplitude Segments Distance Velocity Temp.     ms mV  cm m/s °C   L Median - APB      Palm APB 2.24 10.9 Palm - APB   32.3      Wrist APB 4.32 9.5 Wrist - Palm 8 38 32.3      Elbow APB 8.23 7.4 Elbow - Wrist 20 51 32.3   L Ulnar - ADM      Wrist ADM 2.97 9.6 Wrist - ADM 8  32.2      B. Elbow ADM 6.30 9.2 B. Elbow - Wrist 20 60 32.2      A. Elbow ADM 7.97 9.0 A. Elbow - B. Elbow 10 60 32.2       F Wave      Nerve Fmin % F    ms %   L Median - APB 28.75 90   L Ulnar - ADM 26.04 21.4       EMG      EMG Summary Table     Spontaneous MUAP Recruitment   Muscle Nerve Roots IA Fib PSW Fasc Amp Dur.  PPP Pattern L. Biceps brachii Musculocutaneous C5-C6 N None None None N N N N   L. Triceps brachii Radial C6-C8 N None None None N N N N   L. Pronator teres Median C6-C7 N None None None N N N N   L. Flexor carpi ulnaris Ulnar C7-T1 N None None None N N N N   L. First dorsal interosseous Ulnar C8-T1 N None None None N N N N   L. Abductor pollicis brevis Median D4-J1 N None None None N N N Decr   L. Abductor digiti minimi (manus) Ulnar C8-T1 N None None None N N N N        Study Limitations:  none     Summary of Findings:   Nerve conduction studies: The following nerve conduction studies were abnormal: sensory studies of the left median nerve showed prolonged peak wrist latency and slowing across the wrist. Motor studies of the left median nerve showed slowing across the wrist with normal distal latency and amplitude. All other nerve conduction studies, as listed in the table were normal in latency, amplitude and conduction velocity. Needle EMG:   Needle EMG was performed using a concentric needle. The following abnormalities were seen on needle EMG: there was reduced recruitment in the left abductor pollicis brevis muscle. Otherwise, observed motor units were normal in amplitude, duration, phases and recruitment and no active denervation signs were seen. Diagnostic Interpretation: This study was abnormal.     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 It is chronic in duration. Prognosis for demyelinating lesions is good. Symptoms do not correlate with clinical diagnosis of carpal tunnel syndrome. 2. No electrodiagnostic evidence for any other peripheral nerve mononeuropathy or C5-T1 motor radiculopathy in the left upper extremity. Previous Study: There is not a prior study for comparison.     Follow up EMG is recommended if clinically indicated. Technologist: Leandra Tony  Physician:Meghan Moody 60, DO, [address] [place] [address]   Board Certified Physical Medicine and Rehabilitation      Nerve conduction studies and electromyography were performed according to our laboratory policies and procedures which can be provided upon request. All abnormal values are identified in the table. Laboratory normal values can also be provided upon request.       Cc: No ref.  provider found  Kerry Newman MD

## 2024-01-16 ENCOUNTER — HOSPITAL ENCOUNTER (OUTPATIENT)
Age: 59
Discharge: HOME | End: 2024-01-18

## 2024-01-16 PROCEDURE — 88305 TISSUE EXAM BY PATHOLOGIST: CPT

## 2024-01-18 LAB — SURGICAL PATHOLOGY REPORT: NORMAL
